# Patient Record
Sex: MALE | URBAN - METROPOLITAN AREA
[De-identification: names, ages, dates, MRNs, and addresses within clinical notes are randomized per-mention and may not be internally consistent; named-entity substitution may affect disease eponyms.]

---

## 2017-04-12 ENCOUNTER — INPATIENT (INPATIENT)
Facility: HOSPITAL | Age: 52
LOS: 2 days | Discharge: HOME | End: 2017-04-15
Attending: HOSPITALIST | Admitting: HOSPITALIST
Payer: COMMERCIAL

## 2017-04-12 VITALS
TEMPERATURE: 98 F | RESPIRATION RATE: 16 BRPM | OXYGEN SATURATION: 100 % | SYSTOLIC BLOOD PRESSURE: 129 MMHG | HEART RATE: 56 BPM | DIASTOLIC BLOOD PRESSURE: 75 MMHG

## 2017-04-12 DIAGNOSIS — L03.90 CELLULITIS, UNSPECIFIED: ICD-10-CM

## 2017-04-12 LAB
ALBUMIN SERPL ELPH-MCNC: 4.3 G/DL — SIGNIFICANT CHANGE UP (ref 3.3–5)
ALP SERPL-CCNC: 59 U/L — SIGNIFICANT CHANGE UP (ref 40–120)
ALT FLD-CCNC: 28 U/L — SIGNIFICANT CHANGE UP (ref 4–41)
AST SERPL-CCNC: 28 U/L — SIGNIFICANT CHANGE UP (ref 4–40)
BASE EXCESS BLDV CALC-SCNC: 2.5 MMOL/L — SIGNIFICANT CHANGE UP
BASOPHILS # BLD AUTO: 0.02 K/UL — SIGNIFICANT CHANGE UP (ref 0–0.2)
BASOPHILS NFR BLD AUTO: 0.3 % — SIGNIFICANT CHANGE UP (ref 0–2)
BILIRUB SERPL-MCNC: 0.4 MG/DL — SIGNIFICANT CHANGE UP (ref 0.2–1.2)
BLOOD GAS VENOUS - CREATININE: 1 MG/DL — SIGNIFICANT CHANGE UP (ref 0.5–1.3)
BUN SERPL-MCNC: 18 MG/DL — SIGNIFICANT CHANGE UP (ref 7–23)
CALCIUM SERPL-MCNC: 9.3 MG/DL — SIGNIFICANT CHANGE UP (ref 8.4–10.5)
CHLORIDE BLDV-SCNC: 104 MMOL/L — SIGNIFICANT CHANGE UP (ref 96–108)
CHLORIDE SERPL-SCNC: 102 MMOL/L — SIGNIFICANT CHANGE UP (ref 98–107)
CO2 SERPL-SCNC: 25 MMOL/L — SIGNIFICANT CHANGE UP (ref 22–31)
CREAT SERPL-MCNC: 1.07 MG/DL — SIGNIFICANT CHANGE UP (ref 0.5–1.3)
EOSINOPHIL # BLD AUTO: 0.1 K/UL — SIGNIFICANT CHANGE UP (ref 0–0.5)
EOSINOPHIL NFR BLD AUTO: 1.3 % — SIGNIFICANT CHANGE UP (ref 0–6)
GAS PNL BLDV: 138 MMOL/L — SIGNIFICANT CHANGE UP (ref 136–146)
GLUCOSE BLDV-MCNC: 108 — HIGH (ref 70–99)
GLUCOSE SERPL-MCNC: 108 MG/DL — HIGH (ref 70–99)
HCO3 BLDV-SCNC: 25 MMOL/L — SIGNIFICANT CHANGE UP (ref 20–27)
HCT VFR BLD CALC: 40.7 % — SIGNIFICANT CHANGE UP (ref 39–50)
HCT VFR BLDV CALC: 44.7 % — SIGNIFICANT CHANGE UP (ref 39–51)
HGB BLD-MCNC: 14.5 G/DL — SIGNIFICANT CHANGE UP (ref 13–17)
HGB BLDV-MCNC: 14.6 G/DL — SIGNIFICANT CHANGE UP (ref 13–17)
IMM GRANULOCYTES NFR BLD AUTO: 0.1 % — SIGNIFICANT CHANGE UP (ref 0–1.5)
LACTATE BLDV-MCNC: 1.2 MMOL/L — SIGNIFICANT CHANGE UP (ref 0.5–2)
LYMPHOCYTES # BLD AUTO: 2.63 K/UL — SIGNIFICANT CHANGE UP (ref 1–3.3)
LYMPHOCYTES # BLD AUTO: 33.2 % — SIGNIFICANT CHANGE UP (ref 13–44)
MCHC RBC-ENTMCNC: 32.7 PG — SIGNIFICANT CHANGE UP (ref 27–34)
MCHC RBC-ENTMCNC: 35.6 % — SIGNIFICANT CHANGE UP (ref 32–36)
MCV RBC AUTO: 91.7 FL — SIGNIFICANT CHANGE UP (ref 80–100)
MONOCYTES # BLD AUTO: 0.68 K/UL — SIGNIFICANT CHANGE UP (ref 0–0.9)
MONOCYTES NFR BLD AUTO: 8.6 % — SIGNIFICANT CHANGE UP (ref 2–14)
NEUTROPHILS # BLD AUTO: 4.49 K/UL — SIGNIFICANT CHANGE UP (ref 1.8–7.4)
NEUTROPHILS NFR BLD AUTO: 56.5 % — SIGNIFICANT CHANGE UP (ref 43–77)
PCO2 BLDV: 48 MMHG — SIGNIFICANT CHANGE UP (ref 41–51)
PH BLDV: 7.37 PH — SIGNIFICANT CHANGE UP (ref 7.32–7.43)
PLATELET # BLD AUTO: 187 K/UL — SIGNIFICANT CHANGE UP (ref 150–400)
PMV BLD: 12.1 FL — SIGNIFICANT CHANGE UP (ref 7–13)
PO2 BLDV: 31 MMHG — LOW (ref 35–40)
POTASSIUM BLDV-SCNC: 3.8 MMOL/L — SIGNIFICANT CHANGE UP (ref 3.4–4.5)
POTASSIUM SERPL-MCNC: 4.1 MMOL/L — SIGNIFICANT CHANGE UP (ref 3.5–5.3)
POTASSIUM SERPL-SCNC: 4.1 MMOL/L — SIGNIFICANT CHANGE UP (ref 3.5–5.3)
PROT SERPL-MCNC: 7.5 G/DL — SIGNIFICANT CHANGE UP (ref 6–8.3)
RBC # BLD: 4.44 M/UL — SIGNIFICANT CHANGE UP (ref 4.2–5.8)
RBC # FLD: 12.4 % — SIGNIFICANT CHANGE UP (ref 10.3–14.5)
SAO2 % BLDV: 52.4 % — LOW (ref 60–85)
SODIUM SERPL-SCNC: 140 MMOL/L — SIGNIFICANT CHANGE UP (ref 135–145)
WBC # BLD: 7.93 K/UL — SIGNIFICANT CHANGE UP (ref 3.8–10.5)
WBC # FLD AUTO: 7.93 K/UL — SIGNIFICANT CHANGE UP (ref 3.8–10.5)

## 2017-04-12 RX ORDER — VANCOMYCIN HCL 1 G
1000 VIAL (EA) INTRAVENOUS ONCE
Qty: 0 | Refills: 0 | Status: DISCONTINUED | OUTPATIENT
Start: 2017-04-12 | End: 2017-04-12

## 2017-04-12 RX ORDER — PIPERACILLIN AND TAZOBACTAM 4; .5 G/20ML; G/20ML
3.38 INJECTION, POWDER, LYOPHILIZED, FOR SOLUTION INTRAVENOUS ONCE
Qty: 0 | Refills: 0 | Status: DISCONTINUED | OUTPATIENT
Start: 2017-04-12 | End: 2017-04-12

## 2017-04-12 RX ADMIN — Medication 100 MILLIGRAM(S): at 21:07

## 2017-04-12 NOTE — ED PROVIDER NOTE - ATTENDING CONTRIBUTION TO CARE
52 yo male no pmh cellulitis failing outpatient, afvss no distress rrr cta abd soft tracking to his distal forarm and proximal from his elbow circumferentially//failing op abx, admit for abx

## 2017-04-12 NOTE — ED ADULT NURSE NOTE - OBJECTIVE STATEMENT
pt. received inn intake 10 B , Pt. A&Ox3 c/o of left arm swelling and redness. Pt. states he had a cut on left arm , and went to the gym where he suspect the arm got infected. Pt. was sent from pcp. Pt. appears comfortable , receiving meds. IV placed on right ac 20 gauge , labs drawn and sent. Will continue to monitor while in the ED.

## 2017-04-12 NOTE — ED ADULT TRIAGE NOTE - CHIEF COMPLAINT QUOTE
Pt was being treated for cellulitis of the left arm on bactrim pt was sent in today for worsening cellulitis of the left arm

## 2017-04-12 NOTE — ED PROVIDER NOTE - OBJECTIVE STATEMENT
50 yo M with no PMH presenting with cellulitis. PT was diagnosed by ephraim with cellulitis and started on bactrim. However, erythema continued to spread and pt was sent to the ER for further evaluation. Denies fevers, chills, cough, rhinorrhea, otorrhea, otalgia, nausea, vomiting, constipation, diarrhea, chest pain, shortness of breath or changes in urinary habits. Symptoms started 5 days ago and pt has been on 2 days of abx.

## 2017-04-12 NOTE — ED PROVIDER NOTE - PHYSICAL EXAMINATION
rash involving the elbow, volar surface of left forearm and wrist - no pain with extension/flexion of elbow - no joint effusion, erythematous consistent with cellulitis

## 2017-04-13 DIAGNOSIS — Z41.8 ENCOUNTER FOR OTHER PROCEDURES FOR PURPOSES OTHER THAN REMEDYING HEALTH STATE: ICD-10-CM

## 2017-04-13 DIAGNOSIS — R00.1 BRADYCARDIA, UNSPECIFIED: ICD-10-CM

## 2017-04-13 DIAGNOSIS — Z78.9 OTHER SPECIFIED HEALTH STATUS: ICD-10-CM

## 2017-04-13 DIAGNOSIS — L03.114 CELLULITIS OF LEFT UPPER LIMB: ICD-10-CM

## 2017-04-13 LAB
APPEARANCE UR: CLEAR — SIGNIFICANT CHANGE UP
BILIRUB UR-MCNC: NEGATIVE — SIGNIFICANT CHANGE UP
BLOOD UR QL VISUAL: NEGATIVE — SIGNIFICANT CHANGE UP
COLOR SPEC: SIGNIFICANT CHANGE UP
GLUCOSE UR-MCNC: NEGATIVE — SIGNIFICANT CHANGE UP
KETONES UR-MCNC: NEGATIVE — SIGNIFICANT CHANGE UP
LEUKOCYTE ESTERASE UR-ACNC: NEGATIVE — SIGNIFICANT CHANGE UP
NITRITE UR-MCNC: NEGATIVE — SIGNIFICANT CHANGE UP
PH UR: 7 — SIGNIFICANT CHANGE UP (ref 4.6–8)
PROT UR-MCNC: NEGATIVE — SIGNIFICANT CHANGE UP
SP GR SPEC: 1.01 — SIGNIFICANT CHANGE UP (ref 1–1.03)
SPECIMEN SOURCE: SIGNIFICANT CHANGE UP
SPECIMEN SOURCE: SIGNIFICANT CHANGE UP
UROBILINOGEN FLD QL: NORMAL E.U. — SIGNIFICANT CHANGE UP (ref 0.1–0.2)

## 2017-04-13 PROCEDURE — 73070 X-RAY EXAM OF ELBOW: CPT | Mod: 26,LT

## 2017-04-13 PROCEDURE — 99223 1ST HOSP IP/OBS HIGH 75: CPT

## 2017-04-13 PROCEDURE — 93971 EXTREMITY STUDY: CPT | Mod: 26

## 2017-04-13 PROCEDURE — 93010 ELECTROCARDIOGRAM REPORT: CPT

## 2017-04-13 RX ADMIN — Medication 100 MILLIGRAM(S): at 21:14

## 2017-04-13 RX ADMIN — Medication 100 MILLIGRAM(S): at 07:16

## 2017-04-13 RX ADMIN — Medication 100 MILLIGRAM(S): at 13:19

## 2017-04-13 NOTE — DISCHARGE NOTE ADULT - PATIENT PORTAL LINK FT
“You can access the [facility] Patient Portal, offered by [facility], by registering with the following website: [email or web address]”

## 2017-04-13 NOTE — DISCHARGE NOTE ADULT - HOSPITAL COURSE
51 -year-old male with no known PMH presenting for worsening LUE cellulitis    Cellulitis of left upper extremity  - IV clindamycin   - Bcx  - wbc not elevated    Bradycardia.    - Sinus  -Pt asx     Need for prophylactic measure  - low risk for DVT, ambulate as tolerated. 51 -year-old male with no known PMH presenting for worsening LUE cellulitis    Cellulitis of left upper extremity:  - Infectious disease cx  - IV clindamycin   - Blood cultures: NTD -----  - WBC not elevated  - LUE Duplex US: negative for DVT ------  - UA: negative  - Left elbow x-ray to r/o bursitis: --------     Bradycardia:    - Sinus  - Pt asymptomatic  - TSH WNL    Need for prophylactic measure  - low risk for DVT, ambulate as tolerated. 51 -year-old male with no known PMH presenting for worsening LUE cellulitis    Cellulitis of left upper extremity:  - Infectious disease cx  - IV clindamycin   - Blood cultures: NTD -----  - WBC not elevated  - LUE Duplex US: negative for DVT  - UA: negative  - Left elbow x-ray to r/o bursitis: Non specific soft tissue swelling over olecranon. Tiny protuberant olecranon enthesophyte    Bradycardia:    - Sinus  - Pt asymptomatic  - TSH WNL    Need for prophylactic measure  - low risk for DVT, ambulate as tolerated. 51 -year-old male with no known past medical history presenting to the ED from [facility] urgent care with worsening cellulitis.  Patient reports a mechanical fall 2 weeks ago when he slipped on the stairs while wearing socks, landing on his left elbow which resulted in a small laceration to the elbow.  Patient denied LOC/head trauma associated with the fall.  Patient reports going to the gym later that week and believes with arm flexion the small laceration on the elbow opened and a few hours later he began to notice erythema and swelling around the elbow.  He went to [facility] at this point and was prescribed Bactrim of which he took a total of 5 pills since Monday.  Yesterday he noted continued extension of the erythema and swelling and therefore returned to the [facility] who gave him the option of changing his antibiotic or going to the ED for IV antibiotics.  Patient denies ever having fevers/chills, numbness/paresthesias in the hand.      In the ED VS:  98  56  129/75  16  100%RA, received Clindamycin 900mg IV x1    51 -year-old male with no known PMH presenting for worsening LUE cellulitis    Cellulitis of left upper extremity:  - Infectious disease cx  - IV clindamycin   - Blood cultures: NTD   - WBC not elevated  - LUE Duplex US: negative for DVT  - UA: negative  - Left elbow x-ray to r/o bursitis: Non specific soft tissue swelling over olecranon. Tiny protuberant olecranon enthesophyte   He was seen by ID and rec to continue with Iv Clindamycin and transition to PO in Clindamycin on 4/15/17 , with Clindamycin  300mg q6 untill 4/21/17 to complete a total of 10 days of antibiotics.  Bradycardia:    - Sinus  - Pt asymptomatic  - TSH WNL    Need for prophylactic measure  - low risk for DVT, ambulate as tolerated. 51 -year-old male with no known past medical history presenting to the ED from [facility] urgent care with worsening cellulitis.  Patient reports a mechanical fall 2 weeks ago when he slipped on the stairs while wearing socks, landing on his left elbow which resulted in a small laceration to the elbow.  Patient denied LOC/head trauma associated with the fall.  Patient reports going to the gym later that week and believes with arm flexion the small laceration on the elbow opened and a few hours later he began to notice erythema and swelling around the elbow.  He went to [facility] at this point and was prescribed Bactrim of which he took a total of 5 pills since Monday. Pt presented to ED d/t worsening of edema  Dx w/Cellulitis of left upper extremity, Infectious disease cx. started on  IV clindamycin. Blood cultures negative. LUE Duplex US: negative for DVT. Left elbow x-ray to r/o bursitis: Non specific soft tissue swelling over olecranon. Tiny protuberant olecranon enthesophyte. Pt w/clinical improvement will change to PO in Clindamycin Clindamycin  300mg q6 untill 4/21/17 to complete a total of 10 days of antibiotics.  - Sinus Bradycardia, pt asymptomatic. TSH WNL  Pt is optimized for discharge

## 2017-04-13 NOTE — DISCHARGE NOTE ADULT - CARE PROVIDERS DIRECT ADDRESSES
,DirectAddress_Unknown,DirectAddress_Unknown,DirectAddress_Unknown ,DirectAddress_Unknown,DirectAddress_Unknown,[email or web address],DirectAddress_Unknown

## 2017-04-13 NOTE — H&P ADULT. - SKIN COMMENTS
erythema receding from lines of demarkation (drawn in ED) extends from wrist to peripheral 1/3 of upper arm

## 2017-04-13 NOTE — H&P ADULT. - MUSCULOSKELETAL COMMENTS
swelling/pitting 1+ edema to arm below elbow, not involving hand/wrist, without pain to palpation or loss of sensation

## 2017-04-13 NOTE — DISCHARGE NOTE ADULT - CARE PROVIDER_API CALL
PCP,   Phone: (   )    -  Fax: (   )    -    [facility] - Medical Offices,   Phone: [phone]  Fax: (   )    - PCP,   Phone: (   )    -  Fax: (   )    -    [facility] - Medical Offices,   Phone: [phone]  Fax: (   )    -    Trish Villalta), Infectious Disease; Internal Medicine  [id number] [address] [place] [id number]  Phone: [phone]  Fax: [phone]

## 2017-04-13 NOTE — H&P ADULT. - PROBLEM SELECTOR PLAN 1
will continue with IV clindamycin for now and monitor clinically for improvement, if continued recession from line of demarkation, can consider changing to oral clinda later in the day  no leukocytosis or signs of systemic infection

## 2017-04-13 NOTE — H&P ADULT. - LYMPHATIC
supraclavicular L/posterior cervical L/anterior cervical L/posterior cervical R/supraclavicular R/anterior cervical R

## 2017-04-13 NOTE — H&P ADULT. - FAMILY HISTORY
Child  Still living? Unknown  Family history of type 1 diabetes mellitus, Age at diagnosis: Age Unknown

## 2017-04-13 NOTE — DISCHARGE NOTE ADULT - PROVIDER TOKENS
FREE:[LAST:[PCP],PHONE:[(   )    -],FAX:[(   )    -]],FREE:[LAST:[[facility] - Medical Offices],PHONE:[phone]],FAX:[(   )    -]] FREE:[LAST:[PCP],PHONE:[(   )    -],FAX:[(   )    -]],FREE:[LAST:[[facility] - Medical Offices],PHONE:[phone]],FAX:[(   )    -]],TOKEN:'[id number]'

## 2017-04-13 NOTE — DISCHARGE NOTE ADULT - NS AS ACTIVITY OBS
Walking-Outdoors allowed/Showering allowed/Bathing allowed Stairs allowed/Bathing allowed/Driving allowed/Walking-Outdoors allowed/Sex allowed/Walking-Indoors allowed/Showering allowed

## 2017-04-13 NOTE — DISCHARGE NOTE ADULT - PLAN OF CARE
Follow up with PCP within 1 week of discharge. Monitor for erythema, warmth, fevers chills. Continue appropriate antibiotic therapy until completed. Asymptomatic in hospital. Follow up with PCP within 1 week of discharge to monitor HR. Return to ED if chest pain or difficulty breathing Follow up with ID within 1 week of discharge. Return to ED if fevers chills, worsening redness or warmth. Asymptomatic in hospital. TSH within normal limits. Follow up with PCP within 1 week of discharge to monitor HR. Follow up with ID within 1 week of discharge?-----. Return to ED if fevers chills, worsening redness or warmth. Symptom control rate control

## 2017-04-13 NOTE — DISCHARGE NOTE ADULT - REASON FOR ADMISSION
Rednesss and Swelling on L upper arm Rednesss and Swelling on L upper arm- L Arm cellulitis and L elbow cellulitis with failure of out patient PO antibiotics. Redness and Swelling on L upper arm- L Arm cellulitis and L elbow cellulitis with failure of out patient PO antibiotics.

## 2017-04-13 NOTE — DISCHARGE NOTE ADULT - NS TRANSFER PATIENT BELONGINGS
Electronic Device (specify)/Jewelry/Money (specify)/laptop, necklace/Clothing/Cell Phone/PDA (specify)

## 2017-04-13 NOTE — H&P ADULT. - HISTORY OF PRESENT ILLNESS
51 -year-old male with no known past medical history presenting to the ED from his primary care doctor's office with worsening cellulitis    In the ED VS:  98  56  129/75  16  100%RA, received Clindamycin 900mg IV x1 51 -year-old male with no known past medical history presenting to the ED from [facility] urgent care with worsening cellulitis.  Patient reports a mechanical fall 2 weeks ago when he slipped on the stairs while wearing socks, landing on his left elbow which resulted in a small laceration to the elbow.  Patient denied LOC/head trauma associated with the fall.  Patient reports going to the gym later that week and believes with arm flexion the small laceration on the elbow opened and a few hours later he began to notice erythema and swelling around the elbow.  He went to [facility] at this point and was prescribed Bactrim of which he took a total of 5 pills since Monday.  Yesterday he noted continued extension of the erythema and swelling and therefore returned to the [facility] who gave him the option of changing his antibiotic or going to the ED for IV antibiotics.  Patient denies ever having fevers/chills, numbness/paresthesias in the hand.      In the ED VS:  98  56  129/75  16  100%RA, received Clindamycin 900mg IV x1

## 2017-04-13 NOTE — DISCHARGE NOTE ADULT - MEDICATION SUMMARY - MEDICATIONS TO TAKE
I will START or STAY ON the medications listed below when I get home from the hospital:    clindamycin 300 mg oral capsule  -- 1 cap(s) by mouth every 6 hours  -- Finish all this medication unless otherwise directed by prescriber.  Medication should be taken with plenty of water.    -- Indication: For Cellulitis    Acidophilus - oral tablet  -- 2 tab(s) by mouth once a day  -- Indication: For Need for prophylactic measure

## 2017-04-13 NOTE — DISCHARGE NOTE ADULT - CARE PLAN
Principal Discharge DX:	Cellulitis of left upper extremity  Goal:	Continue appropriate antibiotic therapy until completed.  Instructions for follow-up, activity and diet:	Follow up with PCP within 1 week of discharge. Monitor for erythema, warmth, fevers chills.  Secondary Diagnosis:	Bradycardia Principal Discharge DX:	Cellulitis of left upper extremity  Instructions for follow-up, activity and diet:	Follow up with ID within 1 week of discharge. Return to ED if fevers chills, worsening redness or warmth.  Secondary Diagnosis:	Bradycardia  Instructions for follow-up, activity and diet:	Asymptomatic in hospital. Follow up with PCP within 1 week of discharge to monitor HR. Return to ED if chest pain or difficulty breathing Principal Discharge DX:	Cellulitis of left upper extremity  Goal:	Symptom control  Instructions for follow-up, activity and diet:	Follow up with ID within 1 week of discharge?-----. Return to ED if fevers chills, worsening redness or warmth.  Secondary Diagnosis:	Bradycardia  Instructions for follow-up, activity and diet:	Asymptomatic in hospital. TSH within normal limits. Follow up with PCP within 1 week of discharge to monitor HR. Principal Discharge DX:	Cellulitis of left upper extremity  Goal:	Symptom control  Instructions for follow-up, activity and diet:	Follow up with ID within 1 week of discharge. Return to ED if fevers chills, worsening redness or warmth.  Secondary Diagnosis:	Bradycardia  Instructions for follow-up, activity and diet:	Asymptomatic in hospital. TSH within normal limits. Follow up with PCP within 1 week of discharge to monitor HR. Principal Discharge DX:	Cellulitis of left upper extremity  Goal:	Symptom control  Instructions for follow-up, activity and diet:	Follow up with ID within 1 week of discharge. Return to ED if fevers chills, worsening redness or warmth.  Secondary Diagnosis:	Bradycardia  Goal:	rate control  Instructions for follow-up, activity and diet:	Asymptomatic in hospital. TSH within normal limits. Follow up with PCP within 1 week of discharge to monitor HR.

## 2017-04-14 LAB
BASOPHILS # BLD AUTO: 0.03 K/UL — SIGNIFICANT CHANGE UP (ref 0–0.2)
BASOPHILS NFR BLD AUTO: 0.5 % — SIGNIFICANT CHANGE UP (ref 0–2)
BUN SERPL-MCNC: 19 MG/DL — SIGNIFICANT CHANGE UP (ref 7–23)
CALCIUM SERPL-MCNC: 9.5 MG/DL — SIGNIFICANT CHANGE UP (ref 8.4–10.5)
CHLORIDE SERPL-SCNC: 106 MMOL/L — SIGNIFICANT CHANGE UP (ref 98–107)
CO2 SERPL-SCNC: 21 MMOL/L — LOW (ref 22–31)
CREAT SERPL-MCNC: 0.96 MG/DL — SIGNIFICANT CHANGE UP (ref 0.5–1.3)
EOSINOPHIL # BLD AUTO: 0.12 K/UL — SIGNIFICANT CHANGE UP (ref 0–0.5)
EOSINOPHIL NFR BLD AUTO: 1.8 % — SIGNIFICANT CHANGE UP (ref 0–6)
GLUCOSE SERPL-MCNC: 106 MG/DL — HIGH (ref 70–99)
HCT VFR BLD CALC: 44.6 % — SIGNIFICANT CHANGE UP (ref 39–50)
HCT VFR BLD CALC: 44.6 % — SIGNIFICANT CHANGE UP (ref 39–50)
HGB BLD-MCNC: 15.5 G/DL — SIGNIFICANT CHANGE UP (ref 13–17)
HGB BLD-MCNC: 15.5 G/DL — SIGNIFICANT CHANGE UP (ref 13–17)
IMM GRANULOCYTES NFR BLD AUTO: 0.2 % — SIGNIFICANT CHANGE UP (ref 0–1.5)
LYMPHOCYTES # BLD AUTO: 1.94 K/UL — SIGNIFICANT CHANGE UP (ref 1–3.3)
LYMPHOCYTES # BLD AUTO: 29.6 % — SIGNIFICANT CHANGE UP (ref 13–44)
MCHC RBC-ENTMCNC: 32 PG — SIGNIFICANT CHANGE UP (ref 27–34)
MCHC RBC-ENTMCNC: 32 PG — SIGNIFICANT CHANGE UP (ref 27–34)
MCHC RBC-ENTMCNC: 34.8 % — SIGNIFICANT CHANGE UP (ref 32–36)
MCHC RBC-ENTMCNC: 34.8 % — SIGNIFICANT CHANGE UP (ref 32–36)
MCV RBC AUTO: 92.1 FL — SIGNIFICANT CHANGE UP (ref 80–100)
MCV RBC AUTO: 92.1 FL — SIGNIFICANT CHANGE UP (ref 80–100)
MONOCYTES # BLD AUTO: 0.78 K/UL — SIGNIFICANT CHANGE UP (ref 0–0.9)
MONOCYTES NFR BLD AUTO: 11.9 % — SIGNIFICANT CHANGE UP (ref 2–14)
NEUTROPHILS # BLD AUTO: 3.68 K/UL — SIGNIFICANT CHANGE UP (ref 1.8–7.4)
NEUTROPHILS NFR BLD AUTO: 56 % — SIGNIFICANT CHANGE UP (ref 43–77)
PLATELET # BLD AUTO: 218 K/UL — SIGNIFICANT CHANGE UP (ref 150–400)
PLATELET # BLD AUTO: 218 K/UL — SIGNIFICANT CHANGE UP (ref 150–400)
PMV BLD: 12.1 FL — SIGNIFICANT CHANGE UP (ref 7–13)
PMV BLD: 12.1 FL — SIGNIFICANT CHANGE UP (ref 7–13)
POTASSIUM SERPL-MCNC: 5 MMOL/L — SIGNIFICANT CHANGE UP (ref 3.5–5.3)
POTASSIUM SERPL-SCNC: 5 MMOL/L — SIGNIFICANT CHANGE UP (ref 3.5–5.3)
RBC # BLD: 4.84 M/UL — SIGNIFICANT CHANGE UP (ref 4.2–5.8)
RBC # BLD: 4.84 M/UL — SIGNIFICANT CHANGE UP (ref 4.2–5.8)
RBC # FLD: 12.5 % — SIGNIFICANT CHANGE UP (ref 10.3–14.5)
RBC # FLD: 12.5 % — SIGNIFICANT CHANGE UP (ref 10.3–14.5)
SODIUM SERPL-SCNC: 142 MMOL/L — SIGNIFICANT CHANGE UP (ref 135–145)
TSH SERPL-MCNC: 0.27 UIU/ML — SIGNIFICANT CHANGE UP (ref 0.27–4.2)
WBC # BLD: 6.56 K/UL — SIGNIFICANT CHANGE UP (ref 3.8–10.5)
WBC # BLD: 6.56 K/UL — SIGNIFICANT CHANGE UP (ref 3.8–10.5)
WBC # FLD AUTO: 6.56 K/UL — SIGNIFICANT CHANGE UP (ref 3.8–10.5)
WBC # FLD AUTO: 6.56 K/UL — SIGNIFICANT CHANGE UP (ref 3.8–10.5)

## 2017-04-14 PROCEDURE — 99222 1ST HOSP IP/OBS MODERATE 55: CPT | Mod: GC

## 2017-04-14 PROCEDURE — 99239 HOSP IP/OBS DSCHRG MGMT >30: CPT

## 2017-04-14 RX ADMIN — Medication 100 MILLIGRAM(S): at 05:27

## 2017-04-14 RX ADMIN — Medication 100 MILLIGRAM(S): at 21:29

## 2017-04-14 RX ADMIN — Medication 100 MILLIGRAM(S): at 14:23

## 2017-04-15 VITALS
TEMPERATURE: 98 F | DIASTOLIC BLOOD PRESSURE: 69 MMHG | SYSTOLIC BLOOD PRESSURE: 119 MMHG | HEART RATE: 51 BPM | OXYGEN SATURATION: 98 % | RESPIRATION RATE: 18 BRPM

## 2017-04-15 LAB
BASOPHILS # BLD AUTO: 0.02 K/UL — SIGNIFICANT CHANGE UP (ref 0–0.2)
BASOPHILS NFR BLD AUTO: 0.3 % — SIGNIFICANT CHANGE UP (ref 0–2)
BUN SERPL-MCNC: 19 MG/DL — SIGNIFICANT CHANGE UP (ref 7–23)
CALCIUM SERPL-MCNC: 9.4 MG/DL — SIGNIFICANT CHANGE UP (ref 8.4–10.5)
CHLORIDE SERPL-SCNC: 102 MMOL/L — SIGNIFICANT CHANGE UP (ref 98–107)
CO2 SERPL-SCNC: 24 MMOL/L — SIGNIFICANT CHANGE UP (ref 22–31)
CREAT SERPL-MCNC: 0.94 MG/DL — SIGNIFICANT CHANGE UP (ref 0.5–1.3)
EOSINOPHIL # BLD AUTO: 0.14 K/UL — SIGNIFICANT CHANGE UP (ref 0–0.5)
EOSINOPHIL NFR BLD AUTO: 2.2 % — SIGNIFICANT CHANGE UP (ref 0–6)
GLUCOSE SERPL-MCNC: 102 MG/DL — HIGH (ref 70–99)
HCT VFR BLD CALC: 44 % — SIGNIFICANT CHANGE UP (ref 39–50)
HGB BLD-MCNC: 15 G/DL — SIGNIFICANT CHANGE UP (ref 13–17)
IMM GRANULOCYTES NFR BLD AUTO: 0.2 % — SIGNIFICANT CHANGE UP (ref 0–1.5)
LYMPHOCYTES # BLD AUTO: 2.82 K/UL — SIGNIFICANT CHANGE UP (ref 1–3.3)
LYMPHOCYTES # BLD AUTO: 43.4 % — SIGNIFICANT CHANGE UP (ref 13–44)
MCHC RBC-ENTMCNC: 31.4 PG — SIGNIFICANT CHANGE UP (ref 27–34)
MCHC RBC-ENTMCNC: 34.1 % — SIGNIFICANT CHANGE UP (ref 32–36)
MCV RBC AUTO: 92.2 FL — SIGNIFICANT CHANGE UP (ref 80–100)
MONOCYTES # BLD AUTO: 0.54 K/UL — SIGNIFICANT CHANGE UP (ref 0–0.9)
MONOCYTES NFR BLD AUTO: 8.3 % — SIGNIFICANT CHANGE UP (ref 2–14)
NEUTROPHILS # BLD AUTO: 2.97 K/UL — SIGNIFICANT CHANGE UP (ref 1.8–7.4)
NEUTROPHILS NFR BLD AUTO: 45.6 % — SIGNIFICANT CHANGE UP (ref 43–77)
PLATELET # BLD AUTO: 235 K/UL — SIGNIFICANT CHANGE UP (ref 150–400)
PMV BLD: 12.3 FL — SIGNIFICANT CHANGE UP (ref 7–13)
POTASSIUM SERPL-MCNC: 4.1 MMOL/L — SIGNIFICANT CHANGE UP (ref 3.5–5.3)
POTASSIUM SERPL-SCNC: 4.1 MMOL/L — SIGNIFICANT CHANGE UP (ref 3.5–5.3)
RBC # BLD: 4.77 M/UL — SIGNIFICANT CHANGE UP (ref 4.2–5.8)
RBC # FLD: 12.3 % — SIGNIFICANT CHANGE UP (ref 10.3–14.5)
SODIUM SERPL-SCNC: 139 MMOL/L — SIGNIFICANT CHANGE UP (ref 135–145)
WBC # BLD: 6.5 K/UL — SIGNIFICANT CHANGE UP (ref 3.8–10.5)
WBC # FLD AUTO: 6.5 K/UL — SIGNIFICANT CHANGE UP (ref 3.8–10.5)

## 2017-04-15 PROCEDURE — 99231 SBSQ HOSP IP/OBS SF/LOW 25: CPT | Mod: GC

## 2017-04-15 RX ORDER — LACTOBACILLUS ACIDOPHILUS 100MM CELL
2 CAPSULE ORAL
Qty: 60 | Refills: 0 | OUTPATIENT
Start: 2017-04-15 | End: 2017-05-15

## 2017-04-15 RX ADMIN — Medication 100 MILLIGRAM(S): at 05:15

## 2017-04-17 LAB
BACTERIA BLD CULT: SIGNIFICANT CHANGE UP
BACTERIA BLD CULT: SIGNIFICANT CHANGE UP

## 2017-07-10 ENCOUNTER — APPOINTMENT (OUTPATIENT)
Dept: HUMAN REPRODUCTION | Facility: CLINIC | Age: 52
End: 2017-07-10

## 2018-01-15 NOTE — PATIENT PROFILE ADULT. - REASON FOR ADMISSION
Assessment  Assessed   1  Arteriosclerosis of coronary artery (414 00) (I25 10)  2  Benign essential hypertension (401 1) (I10)  3  Hyperlipidemia (272 4) (E78 5)  4  Cerebrovascular accident (CVA) due to other mechanism (434 91) (I63 8)    Plan  Arteriosclerosis of coronary artery    · Renew: Nitrostat 0 4 MG Sublingual Tablet Sublingual; TOME 1 TABLET UNDER THE  TONGUE TODOS 5 MINUTES FOR UP TO 3 DOSES REY RAVINERA NECESARIO FOR  CHEST PAIN CALL 911 IF PAIN PERSISTSTAKE 1 TABLE  Rx By: Genet Jose; Dispense: 30 Days ; #:25 TAB; Refill: 0;For: Arteriosclerosis of   coronary artery; ERIKA = N; Verified Transmission to [facility], [place]; Last Updated By: System, [employer]; 2/5/2016 12:00:25 PM  Benign essential hypertension    · Stop: Hydrochlorothiazide 25 MG Oral Tablet  Rx By: Genet Jose; Dispense: 30 Days ; #:30 TAB; Refill: 3;For: Benign essential   hypertension; ERIKA = N; Sent To: [address]  Cerebrovascular accident (CVA) due to other mechanism    · Follow-up visit in 6 months Evaluation and Treatment  Follow-up  Status: Complete   Done: [id number]  Ordered; For: Cerebrovascular accident (CVA) due to other mechanism;  Ordered By:   Genet Jose  Performed:   Due: 61YFW6861; Last Updated By: Michael Larson;   2/5/2016 12:07:42 PM  Type 2 diabetes mellitus with hyperglycemia    · Renew: MetFORMIN HCl - 1000 MG Oral Tablet; TAKE 1 TABLET TWICE DAILY WITH  MEALS  Rx By: Genet Jose; Dispense: 30 Days ; #:60 Tablet; Refill: 0;For: Type 2 diabetes   mellitus with hyperglycemia; ERIKA = N; Record  Type 2 diabetes mellitus with other circulatory complications    · Changed: From  HumaLOG KwikPen 100 UNIT/ML Subcutaneous Solution Pen-injector   To HumaLOG KwikPen 100 UNIT/ML Subcutaneous Solution Pen-injector INJECT  SUBCUTANEOUSLY AS DIRECTED  Rx By: Genet Jose; Dispense: 0 Days ; #:5 X 3 ML Pen (5 Pens);  Refill: 3;For: Type 2   diabetes mellitus with other circulatory complications; ERIKA = N; Record   · Renew: HumaLOG 100 UNIT/ML Subcutaneous Solution; 10 units SQ before breakfast,  lunch and dinner  Rx By: Pili Lozada; Dispense: 30 Days ; #:1 X 10 ML Vial; Refill: 6;For: Type 2 diabetes   mellitus with other circulatory complications; ERIKA = N; Record    Follow-up visit in 1 month Evaluation and Treatment Follow-up Status: Hold For - Scheduling Requested for: 04RCY2351  Ordered; For: Benign Essential Hypertension (401 1); Ordered By: Pili Lozada Performed:  Due: 98GCV2109   Exercise Stress Echocardiogram Test Status: Hold For - Scheduling Requested for: 19IIT0512  Ordered; For: Exertional Angina (413 9); Ordered By: Pili Lozada PerformDay [facility] Due: 90RHD6763  Follow-up visit in 2 weeks Evaluation and Treatment Follow-up Status: Hold For - Scheduling Requested for: 73OIZ8897  Ordered; For: Exertional Angina (413 9); Ordered By: Pili Lozada Performed:  Due: 23SQN3912     Discussion/Summary  Counseling Documentation With Imm: The patient was counseled regarding diagnostic results, instructions for management, risk factor reductions, prognosis, patient and family education, risks and benefits of treatment options, importance of compliance with treatment  total time of encounter was 45 minutes and 35 minutes was spent counseling  Medication SE Review and Pt Understands Tx: Possible side effects of new medications were reviewed with the patient/guardian today  The treatment plan was reviewed with the patient/guardian  The patient/guardian understands and agrees with the treatment plan   Discussion Summary:   Alysa Coffey is asymptomatic from his coronary disease and he denies any heart failure symptoms  His numbness from his recent stroke is improving  He was unable to perform the blood work  We want to recheck his lipid panel  I also started him on metoprolol 25 mg bid and discontinued hydrochlorothiazide   He has tolerated this well and his blood pressure is controlled  I asked him to perform labs  RTC in 6 months  Chief Complaint  Chief Complaint Free Text Note Form: Follow up feeling well  Chief Complaint Chronic Condition [facility]: Patient is here today for follow up of chronic conditions described in HPI  History of Present Illness  Hospital Based Practices Required Assessment:   Pain Assessment   the patient states they do not have pain  (on a scale of 0 to 10, the patient rates the pain at 0 )   Abuse And Domestic Violence Screen    Yes, the patient is safe at home  The patient states no one is hurting them  Depression And Suicide Screen  No, the patient has not had thoughts of hurting themself  No, the patient has not felt depressed in the past 7 days  Prefered Language is  Armenian  Primary Language is  Armenian  HPI: Patient is a 77 yo [place] male with PMH of HTN HLD IDDM BPH and an MI in 2000 and CVA in 2015  Patient had some exertional dyspnea and arm numbness which triggered an exercise stress echo  Stress test were suggestive of possible stress-induced ischemia of the entire inferior wall which had triggered a catheterization on 12/23/2013  LHC had revealed non-intervenable RCA disease and medical tx was recommended  In terms of his cardiac disease he has been asymptomatic and feels well  He is able to tolerate moderate exercise with no associated angina and his exercise tolerance has improved since last visit  He denies any HF symptoms  He has tolerated his meds well  Currently patient denies orthopnea or PND  He denies any chest pain at rest  Denies LE edema  He was unable to perform the blood test we requested however he has started metoprolol and is feeling well  Review of Systems  Cardiology Male ROS:     Cardiac: as noted in HPI, no chest pain, no rhythm problems, no fainting/blackouts, no heart murmur present, no signs of swelling and no palpitations present     Skin: No complaints of nonhealing sores or skin rash    Psychological: No complaints of feeling depressed, anxiety, panic attacks, or difficulty concentrating  General: no changes in weight, no lack of energy/fatigue, no fever and no frequent infections  Respiratory: no shortness of breath, no cough/sputum, no wheezing, no phlegm and no hemoptysis   History of Asthma  HEENT: no throat problems   Gastrointestinal: No complaints of liver problems, nausea, vomiting, heartburn, constipation, bloody stools, diarrhea, problems swallowing, adbominal pain, or rectal bleeding  Hematologic: No complaints of bleeding disorders, anemia, blood clots, or excessive brusing  Neurological: No complaints of numbness, tingling, dizziness, weakness, seizures, headaches, syncope or fainting, AM fatigue, daytime sleepiness, no witnessed apnea episodes  Musculoskeletal: rash on the thigh  ROS Reviewed:   ROS reviewed  Active Problems  Problems   1  Abdominal pain (789 00) (R10 9)  2  Arteriosclerosis of coronary artery (414 00) (I25 10)  3  Asthma (493 90) (J45 909)  4  Benign essential hypertension (401 1) (I10)  5  Cataract (366 9) (H26 9)  6  Cervicalgia (723 1) (M54 2)  7  Diverticulosis (562 10) (K57 90)  8  Dyspepsia (536 8) (K30)  9  Enlarged prostate without lower urinary tract symptoms (luts) (600 00) (N40 0)  10  Exertional angina (413 9) (I20 8)  11  Gastroesophageal reflux disease with esophagitis (530 11) (K21 0)  12  Hyperlipidemia (272 4) (E78 5)  13  Insomnia (780 52) (G47 00)  14  Limb pain (729 5) (M79 609)  15  Lower abdominal pain (789 09) (R10 30)  16  Lower back pain (724 2) (M54 5)  17  Pain in joint of left shoulder (719 41) (M25 512)  18  Thoracic back pain (724 1) (M54 6)  19  Type 2 diabetes mellitus with hyperglycemia (250 00) (E11 65)  20  Type 2 diabetes mellitus with other circulatory complications (804 20) (S72 30)  21  Varicose Veins Of Lower Extremities (454 9)  22   Visit for pre-operative examination (V72 84) (J02 602)    Past Medical History  Problems   1  History of allergic rhinitis (V12 69) (Z87 09)  2  History of pharyngitis (V12 69) (Z87 09)  3  History of Need for influenza vaccination (V04 81) (Z23)  4  History of Need for Tdap vaccination (V06 1) (Z23)  5  History of Tinea corporis (110 5) (B35 4)  Active Problems And Past Medical History Reviewed: The active problems and past medical history were reviewed and updated today  Surgical History  Problems   1  History of Back Surgery  2  History of Cataract Surgery  3  Hernia Repair  4  History of Transurethral Resection Of Prostate (TURP)  Surgical History Reviewed: The surgical history was reviewed and updated today  Family History  Father   1  Family history of Diabetes Mellitus (V18 0)  Sister   2  Family history of kidney disease (V18 69) (Z84 1)  Family History Reviewed: The family history was reviewed and updated today  Social History  Problems    · Denied: History of Alcohol use   · Denied: History of Drug Use   · Former smoker (V15 82) (N04 057)   · Uses Safety Equipment - Seatbelts  Social History Reviewed: The social history was reviewed and updated today  Current Meds  1  Acetaminophen 500 MG Oral Tablet; TAKE 1 TABLET EVERY 4 TO 6 HOURS AS   NEEDED; Therapy: 22Apr2015 to (Davis Balloon)  Requested for: 22Apr2015; Last   Rx:22Apr2015 Ordered  2  AmLODIPine Besylate 10 MG Oral Tablet; TOME 1 TABLETA TODOS LOS D? AS PARA LA   PRESION ALTERIAL  TAKE 1 TABLETDAILY FOR BLOOD PRESSURE; Therapy: 06XKB3708 to (Etha Yaw)  Requested for: 87UOQ2735; Last   Rx:29Jan2016 Ordered  3  Aspirin 81 MG Oral Tablet; TAKE 1 TABLET DAILY; Therapy: 20DYU6794 to (Evaluate:63Juv6208)  Requested for: 26TUS1024; Last   Rx:08Jan2016 Ordered  4  Atorvastatin Calcium 80 MG Oral Tablet; TAKE 1 TABLET DAILY; Therapy: 21VLC9500 to (Evaluate:02Jan2017)  Requested for: 54WEZ8763; Last   Rx:08Jan2016 Ordered  5   BD Insulin Syringe Ultrafine 31G X 5/16" 0 5 ML Miscellaneous; Use daily with insulin; Therapy: 74Phr0526 to (Evaluate:22Gvk8255)  Requested for: 04BHK6927; Last   Rx:04See1535 Ordered  6  BD Pen Needle Airam U/F 32G X 4 MM Miscellaneous; Therapy: 71TYV2316 to Recorded  7  Comfort EZ Pen Needles 31G X 6 MM Miscellaneous; Therapy: 17GWK6416 to Recorded  8  FreeStyle Freedom Lite w/Device Kit; Test blood sugar once daily before a meal;   Therapy: 89Dro5290 to (Evaluate:65Bmv0259)  Requested for: 32AGG2069; Last   Rx:14Xeq0995 Ordered  9  FreeStyle Lancets Miscellaneous; USE AS DIRECTED; Therapy: 11ZJQ9550 to (Evaluate:15Mar2014)  Requested for: 69WOQ9192; Last   Rx:14Jan2014 Ordered  10  FreeStyle Lite Device; Therapy: 83XVW8974 to Recorded  11  FreeStyle Test In Vitro Strip; USE 1 STRIP 3 TIMES DAILY; Therapy: 20DYP0807 to (Evaluate:22Jun2014)  Requested for: 69HFI2021; Last    Rx:24Mar2014 Ordered  12  HumaLOG 100 UNIT/ML Subcutaneous Solution; 10 units SQ before breakfast, lunch    and dinner; Therapy: 15ENF2739 to (Evaluate:69Uhd5880); Last GY:60OGA6444 Ordered  13  HumaLOG KwikPen 100 UNIT/ML Subcutaneous Solution Pen-injector; Therapy: 79EXU9171 to Recorded  14  Hydrochlorothiazide 25 MG Oral Tablet; TOME 1 TABLET POR VIA ORAL    DIARIAMENTETAKE 1 TABLET BY MOUTH ONCE A DAY; Therapy: 93Kkf6504 to (Andrés Walker)  Requested for: 27ERW6207; Last    Rx:29Jan2016 Ordered  15  Ketoconazole 2 % External Cream; APPLY A THIN LAYER TO AFFECTED AREA(S) TWICE    DAILY; Therapy: 67Frr1652 to (Evaluate:63Sjw3850)  Requested for: 02Byv6652; Last    Rx:12Sep2014 Ordered  16  Ketorolac Tromethamine 0 5 % Ophthalmic Solution; Therapy: 01WLB1766 to Recorded  17  Lantus 100 UNIT/ML Subcutaneous Solution; take 34 units at bedtime; Therapy: 56UNU9755 to (Last Rx:08Jan2016) Ordered  18  Lisinopril 20 MG Oral Tablet; TOME 1 TABLET BY MOUTH PATSY BAUTISTA? AS FOR    BLOOD PRESSURE  TAKE 1 TABLET BY MOUTH DAILY FOR BLOOD PRESSURE;     Therapy: 63UVQ8059 to (Danis [place])  Requested for: 48UNM4142; Last    Rx:08Jan2016 Ordered  19  Melatonin 1 MG Oral Tablet; TAKE 1 TABLET Bedtime PRN insomnia; Therapy: 24FRZ2249 to (Evaluate:07Feb2016)  Requested for: 43KFC1021; Last    Rx:08Jan2016 Ordered  20  MetFORMIN HCl - 1000 MG Oral Tablet; TAKE 1 TABLET TWICE DAILY WITH MEALS; Therapy: 46TAL9579 to (Evaluate:07Feb2016); Last WV:87VNM3335 Ordered  21  Metoprolol Tartrate 25 MG Oral Tablet; TAKE 1 TABLET TWICE DAILY; Therapy: 99VMK8254 to (Evaluate:06Jul2016)  Requested for: 33VUZ9736; Last    Rx:08Jan2016 Ordered  22  Naproxen 375 MG Oral Tablet; TAKE 1 TABLET EVERY 12 HOURS AS NEEDED; Therapy: 24FMH6273 to (Evaluate:17Jan2016)  Requested for: 89XOE9128; Last    Rx:19Oct2015 Ordered  23  Nitrostat 0 4 MG Sublingual Tablet Sublingual; PLACE 1 TABLET UNDER THE TONGUE    EVERY 5 MINUTES FOR UP TO 3 DOSES AS NEEDED FOR CHEST PAIN  CALL    911 IF PAIN PERSISTS; Therapy: 29Ebh7559 to (Evaluate:07Feb2016)  Requested for: 57DJJ4350; Last    Rx:08Jan2016 Ordered  24  Omeprazole 20 MG Oral Capsule Delayed Release; TAKE 1 CAPSULE DAILY EVERY    MORNING BEFORE BREAKFAST; Therapy: 99ZEX3524 to (Danis [place])  Requested for: 24FJE3326; Last    Rx:08Jan2016 Ordered  25  Pharmacist Choice Alcohol Pad; Therapy: 18SGX5112 to Recorded  26  Polymyxin B-Trimethoprim 80498-1 1 UNIT/ML-% Ophthalmic Solution; Therapy: 00AGO2070 to Recorded  27  PrednisoLONE Acetate 1 % Ophthalmic Suspension; Therapy: 51NBD9762 to Recorded  28  Proventil  (90 Base) MCG/ACT Inhalation Aerosol Solution; INHALE 1 TO 2    PUFFS EVERY 4 TO 6 HOURS AS NEEDED; Therapy: 05UWH9550 to (Evaluate:13Byn8572); Last Rx:02Jan2015 Ordered  29  Tamsulosin HCl - 0 4 MG Oral Capsule; TOME 1 CAPSULE POR VIA ORAL    DIARIAMENTETAKE 1 CAPSULE BY MOUTH ONCE A DAY; Therapy: 91Jmr9976 to (Evaluate:78Flg9635)  Requested for: 38YXQ0430; Last    Rx:36Sfl0239 Ordered  30   Visine Advanced Relief 0 05-0 1-1-1 % Ophthalmic Solution; use two drops per eye as    needed for irritation and dryness; Therapy: 44YQV2949 to (Last Rx:12Mar2013) Ordered  Medication List Reviewed: The medication list was reviewed and updated today  Allergies  Medication   1  No Known Drug Allergies  Non-Medication   2  Latex    Vitals  Vital Signs [Data Includes: Current Encounter]    Recorded: 22SLD1749 11:22AM   Temperature 98 5 F   Heart Rate 68   Systolic 564   Diastolic 66   Height 5 ft 7 in   Weight 178 lb 9 14 oz   BMI Calculated 27 97   BSA Calculated 1 93     Physical Exam    Constitutional   General appearance: No acute distress, well appearing and well nourished  Eyes   Ophthalmoscopic examination: Normal appearing optic disc and posterior segments  Ears, Nose, Mouth, and Throat - External inspection of ears and nose: Normal without deformities or discharge  Nasal mucosa, septum, and turbinates: Normal, no edema or discharge  Oropharynx: Clear, nares are clear, mucous membranes are moist    Neck   Neck and thyroid: Normal, supple, trachea midline, no thyromegaly  no JVD no carotid bruit  Pulmonary   Respiratory effort: No increased work of breathing or signs of respiratory distress  Cardiovascular   Palpation of heart: Normal PMI, no thrills  Auscultation of heart: Normal rate and rhythm, normal S1 and S2, no murmurs  Carotid pulses: Normal, 2+ bilaterally  Peripheral vascular exam: Normal pulses throughout, no tenderness, erythema or swelling  Pedal pulses: Normal, 2+ bilaterally  Examination of extremities for edema and/or varicosities: Abnormal   varicose veins  No palpable cords  No edema  Chest - Chest: Normal    Abdomen   Abdomen: Non-tender and no distention  Liver and spleen: No hepatomegaly or splenomegaly  Musculoskeletal Gait and station: Normal gait  normal gait  Digits and nails: Normal without clubbing or cyanosis   Inspection/palpation of joints, bones, and muscles: Normal, ROM normal     Skin - Skin and subcutaneous tissue: Abnormal  left hand dorsum well healed scar  Neurologic - Cranial nerves: II - XII intact  Psychiatric - Orientation to person, place, and time: Normal  Mood and affect: Normal       Results/Data  Diagnostic Studies Reviewed Cardio:   Lab Review:  HDL 60 Cr 0 8 Hgb 13   Catheterization: VENTRICLES: -- There were no left ventricular global or regional wall   motion  abnormalities  Global left ventricular function was normal  EF calculated by  contrast ventriculography was 55 %  CORONARY VESSELS: -- The coronary circulation is right dominant  -- Left main: Angiography showed minor luminal irregularities  -- LAD: Angiography showed minor luminal irregularities  -- Distal circumflex: There was a 60 % stenosis just after OM2  -- Proximal RCA: Angiography showed minor luminal irregularities  -- Mid RCA: Angiography showed minor luminal irregularities  -- Distal RCA: Angiography showed minor luminal irregularities  -- Right PDA: The distal vessel was supplied by collaterals from septal  branches of LAD  There was a 95 % stenosis in the middle third of the vessel  segment  In a second lesion, there was a 100 % stenosis in the distal third of  the vessel segment  This lesion is a chronic total occlusion  IMPRESSIONS:   small rPDA too small meaningful PCI  RECOMMENDATIONS  medical therapy    Attending Note  I evaluated and discussed the patient with Dr Dana Arrieta and agree with his assessment and plan  Collaborating Physician Note: Collaborating Physician: I interviewed and examined the patient        Future Appointments    Date/Time Provider Specialty Site   03/08/2016 11:30 AM Specialty Clinic, Endocrinology  [address]   04/05/2016 09:40 AM Specialty Clinic, Urology  [address]   08/05/2016 11:15 AM Devan Koehler MD Cardiology [address] Signatures   Electronically signed by : Sonya Souza MD; Feb 5 2016 11:47AM EST                       (Author)    Electronically signed by : Marcella Ryan MD; Feb 12 2016  9:44AM EST                       (Author)    Electronically signed by : Marcella Ryan MD; Feb 12 2016  9:46AM EST                       (Author) Rednesss and Swelling on L upper arm

## 2018-03-22 ENCOUNTER — APPOINTMENT (OUTPATIENT)
Dept: INTERNAL MEDICINE | Facility: CLINIC | Age: 53
End: 2018-03-22
Payer: COMMERCIAL

## 2018-03-22 VITALS
OXYGEN SATURATION: 98 % | HEART RATE: 51 BPM | DIASTOLIC BLOOD PRESSURE: 70 MMHG | SYSTOLIC BLOOD PRESSURE: 110 MMHG | HEIGHT: 70 IN | TEMPERATURE: 97.7 F | BODY MASS INDEX: 23.91 KG/M2 | WEIGHT: 167 LBS

## 2018-03-22 DIAGNOSIS — Z83.3 FAMILY HISTORY OF DIABETES MELLITUS: ICD-10-CM

## 2018-03-22 DIAGNOSIS — M00.9 PYOGENIC ARTHRITIS, UNSPECIFIED: ICD-10-CM

## 2018-03-22 DIAGNOSIS — M51.26 OTHER INTERVERTEBRAL DISC DISPLACEMENT, LUMBAR REGION: ICD-10-CM

## 2018-03-22 PROCEDURE — 36415 COLL VENOUS BLD VENIPUNCTURE: CPT

## 2018-03-22 PROCEDURE — 99386 PREV VISIT NEW AGE 40-64: CPT | Mod: 25

## 2018-04-09 LAB
ALBUMIN SERPL ELPH-MCNC: 4.4 G/DL
ALP BLD-CCNC: 57 U/L
ALT SERPL-CCNC: 29 U/L
ANION GAP SERPL CALC-SCNC: 13 MMOL/L
APPEARANCE: CLEAR
AST SERPL-CCNC: 31 U/L
BASOPHILS # BLD AUTO: 0.02 K/UL
BASOPHILS NFR BLD AUTO: 0.3 %
BILIRUB SERPL-MCNC: 0.7 MG/DL
BILIRUBIN URINE: NEGATIVE
BLOOD URINE: NEGATIVE
BUN SERPL-MCNC: 19 MG/DL
CALCIUM SERPL-MCNC: 9.1 MG/DL
CHLORIDE SERPL-SCNC: 104 MMOL/L
CHOLEST SERPL-MCNC: 255 MG/DL
CHOLEST/HDLC SERPL: 4.5 RATIO
CO2 SERPL-SCNC: 24 MMOL/L
COLOR: YELLOW
CREAT SERPL-MCNC: 0.87 MG/DL
EOSINOPHIL # BLD AUTO: 0.08 K/UL
EOSINOPHIL NFR BLD AUTO: 1.2 %
GLUCOSE QUALITATIVE U: NEGATIVE MG/DL
GLUCOSE SERPL-MCNC: 88 MG/DL
HBA1C MFR BLD HPLC: 5.3 %
HCT VFR BLD CALC: 43.7 %
HDLC SERPL-MCNC: 57 MG/DL
HGB BLD-MCNC: 14.7 G/DL
IMM GRANULOCYTES NFR BLD AUTO: 0 %
KETONES URINE: NEGATIVE
LDLC SERPL CALC-MCNC: 181 MG/DL
LEUKOCYTE ESTERASE URINE: NEGATIVE
LYMPHOCYTES # BLD AUTO: 2.74 K/UL
LYMPHOCYTES NFR BLD AUTO: 41.1 %
MAN DIFF?: NORMAL
MCHC RBC-ENTMCNC: 31 PG
MCHC RBC-ENTMCNC: 33.6 GM/DL
MCV RBC AUTO: 92.2 FL
MONOCYTES # BLD AUTO: 0.5 K/UL
MONOCYTES NFR BLD AUTO: 7.5 %
NEUTROPHILS # BLD AUTO: 3.33 K/UL
NEUTROPHILS NFR BLD AUTO: 49.9 %
NITRITE URINE: NEGATIVE
PH URINE: 5
PLATELET # BLD AUTO: 210 K/UL
POTASSIUM SERPL-SCNC: 4.5 MMOL/L
PROT SERPL-MCNC: 7.4 G/DL
PROTEIN URINE: NEGATIVE MG/DL
PSA SERPL-MCNC: 1 NG/ML
RBC # BLD: 4.74 M/UL
RBC # FLD: 12.8 %
SODIUM SERPL-SCNC: 141 MMOL/L
SPECIFIC GRAVITY URINE: 1.03
T4 FREE SERPL-MCNC: 1.4 NG/DL
TRIGL SERPL-MCNC: 84 MG/DL
TSH SERPL-ACNC: 0.2 UIU/ML
UROBILINOGEN URINE: NEGATIVE MG/DL
WBC # FLD AUTO: 6.67 K/UL

## 2018-04-26 ENCOUNTER — APPOINTMENT (OUTPATIENT)
Dept: UROLOGY | Facility: CLINIC | Age: 53
End: 2018-04-26
Payer: COMMERCIAL

## 2018-04-26 ENCOUNTER — OUTPATIENT (OUTPATIENT)
Dept: OUTPATIENT SERVICES | Facility: HOSPITAL | Age: 53
LOS: 1 days | End: 2018-04-26
Payer: COMMERCIAL

## 2018-04-26 ENCOUNTER — LABORATORY RESULT (OUTPATIENT)
Age: 53
End: 2018-04-26

## 2018-04-26 DIAGNOSIS — R35.0 FREQUENCY OF MICTURITION: ICD-10-CM

## 2018-04-26 PROCEDURE — 93975 VASCULAR STUDY: CPT | Mod: 26

## 2018-04-26 PROCEDURE — 76870 US EXAM SCROTUM: CPT

## 2018-04-26 PROCEDURE — 99244 OFF/OP CNSLTJ NEW/EST MOD 40: CPT | Mod: 25

## 2018-04-26 PROCEDURE — 76870 US EXAM SCROTUM: CPT | Mod: 26

## 2018-04-26 PROCEDURE — 93975 VASCULAR STUDY: CPT

## 2018-04-29 LAB
ALBUMIN SERPL ELPH-MCNC: 4.4 G/DL
ALP BLD-CCNC: 62 U/L
ALT SERPL-CCNC: 28 U/L
ANION GAP SERPL CALC-SCNC: 13 MMOL/L
APPEARANCE: CLEAR
AST SERPL-CCNC: 24 U/L
BASOPHILS # BLD AUTO: 0.03 K/UL
BASOPHILS NFR BLD AUTO: 0.5 %
BILIRUB SERPL-MCNC: 0.4 MG/DL
BILIRUBIN URINE: NEGATIVE
BLOOD URINE: NEGATIVE
BUN SERPL-MCNC: 19 MG/DL
CALCIUM SERPL-MCNC: 9.6 MG/DL
CHLORIDE SERPL-SCNC: 104 MMOL/L
CHOLEST SERPL-MCNC: 273 MG/DL
CHOLEST/HDLC SERPL: 5.2 RATIO
CO2 SERPL-SCNC: 25 MMOL/L
COLOR: ABNORMAL
CREAT SERPL-MCNC: 0.94 MG/DL
EOSINOPHIL # BLD AUTO: 0.07 K/UL
EOSINOPHIL NFR BLD AUTO: 1.1 %
ESTRADIOL SERPL-MCNC: 5 PG/ML
GLUCOSE QUALITATIVE U: NEGATIVE MG/DL
GLUCOSE SERPL-MCNC: 90 MG/DL
HBA1C MFR BLD HPLC: 5.2 %
HCT VFR BLD CALC: 42 %
HDLC SERPL-MCNC: 53 MG/DL
HGB BLD-MCNC: 15.2 G/DL
IMM GRANULOCYTES NFR BLD AUTO: 0 %
KETONES URINE: NEGATIVE
LDLC SERPL CALC-MCNC: 191 MG/DL
LEUKOCYTE ESTERASE URINE: NEGATIVE
LH SERPL-ACNC: 2.6 IU/L
LYMPHOCYTES # BLD AUTO: 2.73 K/UL
LYMPHOCYTES NFR BLD AUTO: 42.9 %
MAN DIFF?: NORMAL
MCHC RBC-ENTMCNC: 32.6 PG
MCHC RBC-ENTMCNC: 36.2 GM/DL
MCV RBC AUTO: 90.1 FL
MONOCYTES # BLD AUTO: 0.57 K/UL
MONOCYTES NFR BLD AUTO: 9 %
NEUTROPHILS # BLD AUTO: 2.96 K/UL
NEUTROPHILS NFR BLD AUTO: 46.5 %
NITRITE URINE: NEGATIVE
PH URINE: 5
PLATELET # BLD AUTO: 218 K/UL
POTASSIUM SERPL-SCNC: 4.4 MMOL/L
PROLACTIN SERPL-MCNC: 7.6 NG/ML
PROT SERPL-MCNC: 7 G/DL
PROTEIN URINE: NEGATIVE MG/DL
PSA SERPL-MCNC: 1.06 NG/ML
RBC # BLD: 4.66 M/UL
RBC # FLD: 12.8 %
SHBG SERPL-SCNC: 42 NMOL/L
SODIUM SERPL-SCNC: 142 MMOL/L
SPECIFIC GRAVITY URINE: 1.03
TRIGL SERPL-MCNC: 146 MG/DL
TSH SERPL-ACNC: 0.43 UIU/ML
UROBILINOGEN URINE: NEGATIVE MG/DL
WBC # FLD AUTO: 6.36 K/UL

## 2018-04-30 DIAGNOSIS — E34.9 ENDOCRINE DISORDER, UNSPECIFIED: ICD-10-CM

## 2018-05-03 LAB
TESTOST BND SERPL-MCNC: 4.6 PG/ML
TESTOST SERPL-MCNC: 398.1 NG/DL

## 2018-06-14 ENCOUNTER — APPOINTMENT (OUTPATIENT)
Dept: HUMAN REPRODUCTION | Facility: CLINIC | Age: 53
End: 2018-06-14
Payer: COMMERCIAL

## 2018-06-14 PROCEDURE — 89322 SEMEN ANAL STRICT CRITERIA: CPT

## 2018-06-26 ENCOUNTER — APPOINTMENT (OUTPATIENT)
Dept: UROLOGY | Facility: CLINIC | Age: 53
End: 2018-06-26
Payer: COMMERCIAL

## 2018-06-26 DIAGNOSIS — E34.9 ENDOCRINE DISORDER, UNSPECIFIED: ICD-10-CM

## 2018-06-26 PROCEDURE — 99214 OFFICE O/P EST MOD 30 MIN: CPT

## 2019-04-23 ENCOUNTER — APPOINTMENT (OUTPATIENT)
Dept: INTERNAL MEDICINE | Facility: CLINIC | Age: 54
End: 2019-04-23
Payer: COMMERCIAL

## 2019-04-23 ENCOUNTER — NON-APPOINTMENT (OUTPATIENT)
Age: 54
End: 2019-04-23

## 2019-04-23 VITALS
BODY MASS INDEX: 22.21 KG/M2 | HEART RATE: 56 BPM | WEIGHT: 164 LBS | DIASTOLIC BLOOD PRESSURE: 80 MMHG | TEMPERATURE: 97.3 F | OXYGEN SATURATION: 98 % | SYSTOLIC BLOOD PRESSURE: 126 MMHG | HEIGHT: 72 IN

## 2019-04-23 DIAGNOSIS — Z87.898 PERSONAL HISTORY OF OTHER SPECIFIED CONDITIONS: ICD-10-CM

## 2019-04-23 DIAGNOSIS — Z83.438 FAMILY HISTORY OF OTHER DISORDER OF LIPOPROTEIN METABOLISM AND OTHER LIPIDEMIA: ICD-10-CM

## 2019-04-23 PROCEDURE — 93000 ELECTROCARDIOGRAM COMPLETE: CPT

## 2019-04-23 PROCEDURE — 36415 COLL VENOUS BLD VENIPUNCTURE: CPT

## 2019-04-23 PROCEDURE — G0444 DEPRESSION SCREEN ANNUAL: CPT

## 2019-04-23 PROCEDURE — 99396 PREV VISIT EST AGE 40-64: CPT | Mod: 25

## 2019-04-25 PROBLEM — Z83.438 FAMILY HISTORY OF HYPERLIPIDEMIA: Status: ACTIVE | Noted: 2019-04-25

## 2019-04-25 PROBLEM — Z87.898 HISTORY OF MALE INFERTILITY: Status: RESOLVED | Noted: 2018-03-22 | Resolved: 2019-04-25

## 2019-04-25 NOTE — HEALTH RISK ASSESSMENT
[No falls in past year] : Patient reported no falls in the past year [0] : 2) Feeling down, depressed, or hopeless: Not at all (0) [None] : None [Employed] : employed [] :  [Sexually Active] : sexually active [] : No

## 2019-04-25 NOTE — ASSESSMENT
[FreeTextEntry1] : discussed w pt \par \par check routine labs as below \par \par chronic hyperlipidemia as per pt, likely familial as per his hx. cont diet control, exercise. offered statin tx but he declines. also suggested can try red yeast rice which he will consider \par \par advised screening colonoscopy, he will consider making appt soon \par \par RTO yearly for routine exam or earlier prn if any new concerns \par

## 2019-04-25 NOTE — COUNSELING
[Activity counseling provided] : activity [Healthy eating counseling provided] : healthy eating [ - Annual Depression Screening] : Annual Depression Screening

## 2019-04-25 NOTE — PHYSICAL EXAM
[No Acute Distress] : no acute distress [Well Nourished] : well nourished [Well Developed] : well developed [Well-Appearing] : well-appearing [Normal Sclera/Conjunctiva] : normal sclera/conjunctiva [Normal Voice/Communication] : normal voice/communication [PERRL] : pupils equal round and reactive to light [Normal Outer Ear/Nose] : the outer ears and nose were normal in appearance [Normal Oropharynx] : the oropharynx was normal [Normal TMs] : both tympanic membranes were normal [No JVD] : no jugular venous distention [Supple] : supple [No Lymphadenopathy] : no lymphadenopathy [Thyroid Normal, No Nodules] : the thyroid was normal and there were no nodules present [No Respiratory Distress] : no respiratory distress  [Clear to Auscultation] : lungs were clear to auscultation bilaterally [No Accessory Muscle Use] : no accessory muscle use [Normal Rate] : normal rate  [Regular Rhythm] : with a regular rhythm [Normal S1, S2] : normal S1 and S2 [No Murmur] : no murmur heard [No Carotid Bruits] : no carotid bruits [No Abdominal Bruit] : a ~M bruit was not heard ~T in the abdomen [No Varicosities] : no varicosities [Pedal Pulses Present] : the pedal pulses are present [No Edema] : there was no peripheral edema [No Extremity Clubbing/Cyanosis] : no extremity clubbing/cyanosis [Soft] : abdomen soft [Non-distended] : non-distended [No Masses] : no abdominal mass palpated [Non Tender] : non-tender [No HSM] : no HSM [Normal Bowel Sounds] : normal bowel sounds [Normal Sphincter Tone] : normal sphincter tone [No Mass] : no mass [Prostate Enlargement] : the prostate was not enlarged [Prostate Tenderness] : the prostate was not tender [No Prostate Nodules] : no prostate nodules [Normal Anterior Cervical Nodes] : no anterior cervical lymphadenopathy [Normal Posterior Cervical Nodes] : no posterior cervical lymphadenopathy [No Joint Swelling] : no joint swelling [No Spinal Tenderness] : no spinal tenderness [Grossly Normal Strength/Tone] : grossly normal strength/tone [Normal Gait] : normal gait [No Rash] : no rash [Coordination Grossly Intact] : coordination grossly intact [No Focal Deficits] : no focal deficits [Speech Grossly Normal] : speech grossly normal [Normal Affect] : the affect was normal [Normal Mood] : the mood was normal [Normal Insight/Judgement] : insight and judgment were intact

## 2019-04-25 NOTE — HISTORY OF PRESENT ILLNESS
[FreeTextEntry1] : annual physical exam  [de-identified] : 53 y/o man presents for annual exam. he feels well currently , no new concerns. \par \par likely familial hyperlipidemia. he has excellent diet control. he has declined option to start statin therapy.\par exercises regularly without problems \par \par he had evaluation last year for possible infertility, but sperm analysis was adequate. his wife is continuing further evaluations

## 2020-03-19 LAB
ALBUMIN SERPL ELPH-MCNC: 4.6 G/DL
ALP BLD-CCNC: 55 U/L
ALT SERPL-CCNC: 30 U/L
ANION GAP SERPL CALC-SCNC: 12 MMOL/L
APPEARANCE: CLEAR
AST SERPL-CCNC: 27 U/L
BASOPHILS # BLD AUTO: 0.03 K/UL
BASOPHILS NFR BLD AUTO: 0.5 %
BILIRUB SERPL-MCNC: 0.7 MG/DL
BILIRUBIN URINE: NEGATIVE
BLOOD URINE: NEGATIVE
BUN SERPL-MCNC: 17 MG/DL
CALCIUM SERPL-MCNC: 9.4 MG/DL
CHLORIDE SERPL-SCNC: 107 MMOL/L
CHOLEST SERPL-MCNC: 275 MG/DL
CHOLEST/HDLC SERPL: 5.2 RATIO
CO2 SERPL-SCNC: 22 MMOL/L
COLOR: YELLOW
CREAT SERPL-MCNC: 0.83 MG/DL
EOSINOPHIL # BLD AUTO: 0.06 K/UL
EOSINOPHIL NFR BLD AUTO: 0.9 %
ESTIMATED AVERAGE GLUCOSE: 108 MG/DL
GLUCOSE QUALITATIVE U: NEGATIVE
GLUCOSE SERPL-MCNC: 96 MG/DL
HBA1C MFR BLD HPLC: 5.4 %
HCT VFR BLD CALC: 44.6 %
HDLC SERPL-MCNC: 53 MG/DL
HGB BLD-MCNC: 15.5 G/DL
IMM GRANULOCYTES NFR BLD AUTO: 0.2 %
KETONES URINE: NEGATIVE
LDLC SERPL CALC-MCNC: 204 MG/DL
LEUKOCYTE ESTERASE URINE: NEGATIVE
LYMPHOCYTES # BLD AUTO: 2.35 K/UL
LYMPHOCYTES NFR BLD AUTO: 36.3 %
MAN DIFF?: NORMAL
MCHC RBC-ENTMCNC: 31.9 PG
MCHC RBC-ENTMCNC: 34.8 GM/DL
MCV RBC AUTO: 91.8 FL
MONOCYTES # BLD AUTO: 0.51 K/UL
MONOCYTES NFR BLD AUTO: 7.9 %
NEUTROPHILS # BLD AUTO: 3.52 K/UL
NEUTROPHILS NFR BLD AUTO: 54.2 %
NITRITE URINE: NEGATIVE
PH URINE: 6
PLATELET # BLD AUTO: 214 K/UL
POTASSIUM SERPL-SCNC: 4.1 MMOL/L
PROT SERPL-MCNC: 7 G/DL
PROTEIN URINE: NORMAL
PSA SERPL-MCNC: 1.07 NG/ML
RBC # BLD: 4.86 M/UL
RBC # FLD: 12.1 %
SODIUM SERPL-SCNC: 141 MMOL/L
SPECIFIC GRAVITY URINE: 1.03
T4 FREE SERPL-MCNC: 1.3 NG/DL
TRIGL SERPL-MCNC: 90 MG/DL
TSH SERPL-ACNC: 0.25 UIU/ML
UROBILINOGEN URINE: NORMAL
WBC # FLD AUTO: 6.48 K/UL

## 2020-06-01 ENCOUNTER — APPOINTMENT (OUTPATIENT)
Dept: INTERNAL MEDICINE | Facility: CLINIC | Age: 55
End: 2020-06-01

## 2020-06-02 ENCOUNTER — APPOINTMENT (OUTPATIENT)
Dept: OTOLARYNGOLOGY | Facility: CLINIC | Age: 55
End: 2020-06-02
Payer: COMMERCIAL

## 2020-06-02 VITALS
WEIGHT: 170 LBS | BODY MASS INDEX: 24.34 KG/M2 | SYSTOLIC BLOOD PRESSURE: 128 MMHG | TEMPERATURE: 97.6 F | HEART RATE: 47 BPM | HEIGHT: 70 IN | DIASTOLIC BLOOD PRESSURE: 74 MMHG

## 2020-06-02 DIAGNOSIS — H90.3 SENSORINEURAL HEARING LOSS, BILATERAL: ICD-10-CM

## 2020-06-02 DIAGNOSIS — H61.22 IMPACTED CERUMEN, LEFT EAR: ICD-10-CM

## 2020-06-02 PROCEDURE — 92567 TYMPANOMETRY: CPT

## 2020-06-02 PROCEDURE — 92557 COMPREHENSIVE HEARING TEST: CPT

## 2020-06-02 PROCEDURE — 99204 OFFICE O/P NEW MOD 45 MIN: CPT | Mod: 25

## 2020-06-02 NOTE — HISTORY OF PRESENT ILLNESS
[de-identified] : PAtient has had wax issues and tried to clean his ear with debrox and a Qtip and made it worse. HE feels completely clogged on the left side and cannot hear. 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 He is not having any dizziness but has occasional ringing in the left ear. The hearing is muffled in the left ear all the time. He is not having any nasal congestion or runny nose.

## 2020-06-02 NOTE — ASSESSMENT
[FreeTextEntry1] : Patient with a history of noise induced trauma with a sudden sensorineural sudden hearing loss in his left ear while using Q-tips most likely due to cerumen impaction on examination massive cerumen in his left ear which was curetted out audiogram confirmed his baseline hearing loss no further acute interventions indicated I do recommend he follow-up on an annual basis.

## 2020-06-04 ENCOUNTER — APPOINTMENT (OUTPATIENT)
Dept: INTERNAL MEDICINE | Facility: CLINIC | Age: 55
End: 2020-06-04
Payer: COMMERCIAL

## 2020-06-04 ENCOUNTER — NON-APPOINTMENT (OUTPATIENT)
Age: 55
End: 2020-06-04

## 2020-06-04 VITALS
SYSTOLIC BLOOD PRESSURE: 115 MMHG | TEMPERATURE: 98.1 F | HEIGHT: 70 IN | HEART RATE: 5 BPM | OXYGEN SATURATION: 98 % | WEIGHT: 167 LBS | BODY MASS INDEX: 23.91 KG/M2 | DIASTOLIC BLOOD PRESSURE: 75 MMHG

## 2020-06-04 DIAGNOSIS — Z11.59 ENCOUNTER FOR SCREENING FOR OTHER VIRAL DISEASES: ICD-10-CM

## 2020-06-04 PROCEDURE — 36415 COLL VENOUS BLD VENIPUNCTURE: CPT

## 2020-06-04 PROCEDURE — 93000 ELECTROCARDIOGRAM COMPLETE: CPT | Mod: 59

## 2020-06-04 PROCEDURE — 99396 PREV VISIT EST AGE 40-64: CPT | Mod: 25

## 2020-06-04 PROCEDURE — G0444 DEPRESSION SCREEN ANNUAL: CPT

## 2020-06-05 ENCOUNTER — TRANSCRIPTION ENCOUNTER (OUTPATIENT)
Age: 55
End: 2020-06-05

## 2020-07-12 NOTE — HISTORY OF PRESENT ILLNESS
[de-identified] : 56 y/o man presents for annual exam. he feels well currently , no new concerns. exercises without problems. \par \par likely familial hyperlipidemia which has remained consistently elevated for years as per pt. continued excellent diet control, has declined statins. he will be planning to start red yeast rice as option to treat hyperlipidemia.\par \par has not yet had screening colonoscopy but plans for this soon

## 2020-07-12 NOTE — ASSESSMENT
[FreeTextEntry1] : discussed w pt \par \par check routine labs as below \par cont to monitor likely familial hyperlipidemia. he has declined statin therapy \par discussed again in detail re RYR and he plans to try this \par \par again advised re screening colonoscopy, he will plan to make appt this year \par \par declines Tdap vaccine update\par \par cont diet control and regular exercise, healthy lifestyle \par \par RTO yearly for routine exam or earlier prn if any new concerns \par

## 2020-07-12 NOTE — PHYSICAL EXAM
[Well Nourished] : well nourished [No Acute Distress] : no acute distress [Well Developed] : well developed [Normal Voice/Communication] : normal voice/communication [Well-Appearing] : well-appearing [Normal Sclera/Conjunctiva] : normal sclera/conjunctiva [PERRL] : pupils equal round and reactive to light [Normal Outer Ear/Nose] : the outer ears and nose were normal in appearance [Normal Oropharynx] : the oropharynx was normal [Normal TMs] : both tympanic membranes were normal [No JVD] : no jugular venous distention [Supple] : supple [No Lymphadenopathy] : no lymphadenopathy [Thyroid Normal, No Nodules] : the thyroid was normal and there were no nodules present [No Respiratory Distress] : no respiratory distress  [Clear to Auscultation] : lungs were clear to auscultation bilaterally [No Accessory Muscle Use] : no accessory muscle use [Normal Rate] : normal rate  [Regular Rhythm] : with a regular rhythm [No Murmur] : no murmur heard [Normal S1, S2] : normal S1 and S2 [No Carotid Bruits] : no carotid bruits [No Varicosities] : no varicosities [No Abdominal Bruit] : a ~M bruit was not heard ~T in the abdomen [Pedal Pulses Present] : the pedal pulses are present [No Edema] : there was no peripheral edema [No Extremity Clubbing/Cyanosis] : no extremity clubbing/cyanosis [Soft] : abdomen soft [Non Tender] : non-tender [Non-distended] : non-distended [No Masses] : no abdominal mass palpated [Normal Bowel Sounds] : normal bowel sounds [No HSM] : no HSM [Normal Sphincter Tone] : normal sphincter tone [No Mass] : no mass [Prostate Enlargement] : the prostate was not enlarged [No Prostate Nodules] : no prostate nodules [Prostate Tenderness] : the prostate was not tender [Normal Anterior Cervical Nodes] : no anterior cervical lymphadenopathy [Normal Posterior Cervical Nodes] : no posterior cervical lymphadenopathy [No Joint Swelling] : no joint swelling [No Spinal Tenderness] : no spinal tenderness [No Rash] : no rash [Grossly Normal Strength/Tone] : grossly normal strength/tone [Normal Gait] : normal gait [Coordination Grossly Intact] : coordination grossly intact [No Focal Deficits] : no focal deficits [Speech Grossly Normal] : speech grossly normal [Alert and Oriented x3] : oriented to person, place, and time [Normal Affect] : the affect was normal [Normal Mood] : the mood was normal [Normal Insight/Judgement] : insight and judgment were intact

## 2020-07-12 NOTE — HEALTH RISK ASSESSMENT
[No] : In the past 12 months have you used drugs other than those required for medical reasons? No [No falls in past year] : Patient reported no falls in the past year [0] : 2) Feeling down, depressed, or hopeless: Not at all (0) [None] : None [Employed] : employed [] :  [Sexually Active] : sexually active [] : No

## 2020-09-21 LAB
ALBUMIN SERPL ELPH-MCNC: 4.9 G/DL
ALP BLD-CCNC: 60 U/L
ALT SERPL-CCNC: 28 U/L
ANION GAP SERPL CALC-SCNC: 10 MMOL/L
APPEARANCE: CLEAR
AST SERPL-CCNC: 26 U/L
BASOPHILS # BLD AUTO: 0.05 K/UL
BASOPHILS NFR BLD AUTO: 0.8 %
BILIRUB SERPL-MCNC: 0.9 MG/DL
BILIRUBIN URINE: NEGATIVE
BLOOD URINE: NEGATIVE
BUN SERPL-MCNC: 19 MG/DL
CALCIUM SERPL-MCNC: 10 MG/DL
CHLORIDE SERPL-SCNC: 104 MMOL/L
CHOLEST SERPL-MCNC: 279 MG/DL
CHOLEST/HDLC SERPL: 5.3 RATIO
CO2 SERPL-SCNC: 26 MMOL/L
COLOR: YELLOW
CREAT SERPL-MCNC: 0.94 MG/DL
EOSINOPHIL # BLD AUTO: 0.11 K/UL
EOSINOPHIL NFR BLD AUTO: 1.8 %
ESTIMATED AVERAGE GLUCOSE: 100 MG/DL
GLUCOSE QUALITATIVE U: NEGATIVE
GLUCOSE SERPL-MCNC: 94 MG/DL
HBA1C MFR BLD HPLC: 5.1 %
HCT VFR BLD CALC: 45.7 %
HDLC SERPL-MCNC: 52 MG/DL
HGB BLD-MCNC: 15.3 G/DL
IMM GRANULOCYTES NFR BLD AUTO: 0.5 %
KETONES URINE: NEGATIVE
LDLC SERPL CALC-MCNC: 205 MG/DL
LDLC SERPL DIRECT ASSAY-MCNC: 217 MG/DL
LEUKOCYTE ESTERASE URINE: NEGATIVE
LYMPHOCYTES # BLD AUTO: 2.53 K/UL
LYMPHOCYTES NFR BLD AUTO: 40.3 %
MAN DIFF?: NORMAL
MCHC RBC-ENTMCNC: 31.5 PG
MCHC RBC-ENTMCNC: 33.5 GM/DL
MCV RBC AUTO: 94.2 FL
MONOCYTES # BLD AUTO: 0.59 K/UL
MONOCYTES NFR BLD AUTO: 9.4 %
NEUTROPHILS # BLD AUTO: 2.97 K/UL
NEUTROPHILS NFR BLD AUTO: 47.2 %
NITRITE URINE: NEGATIVE
PH URINE: 5.5
PLATELET # BLD AUTO: 227 K/UL
POTASSIUM SERPL-SCNC: 4.5 MMOL/L
PROT SERPL-MCNC: 7.2 G/DL
PROTEIN URINE: NORMAL
PSA SERPL-MCNC: 1.01 NG/ML
RBC # BLD: 4.85 M/UL
RBC # FLD: 12.1 %
SARS-COV-2 IGG SERPL IA-ACNC: 0.1 INDEX
SARS-COV-2 IGG SERPL QL IA: NEGATIVE
SODIUM SERPL-SCNC: 141 MMOL/L
SPECIFIC GRAVITY URINE: 1.03
T4 FREE SERPL-MCNC: 1.3 NG/DL
TRIGL SERPL-MCNC: 109 MG/DL
TSH SERPL-ACNC: 0.22 UIU/ML
UROBILINOGEN URINE: NORMAL
WBC # FLD AUTO: 6.28 K/UL

## 2022-01-27 ENCOUNTER — APPOINTMENT (OUTPATIENT)
Dept: HUMAN REPRODUCTION | Facility: CLINIC | Age: 57
End: 2022-01-27

## 2022-03-13 ENCOUNTER — TRANSCRIPTION ENCOUNTER (OUTPATIENT)
Age: 57
End: 2022-03-13

## 2022-04-11 PROBLEM — Z11.59 SCREENING FOR VIRAL DISEASE: Status: ACTIVE | Noted: 2020-06-04

## 2022-07-09 ENCOUNTER — APPOINTMENT (OUTPATIENT)
Dept: HUMAN REPRODUCTION | Facility: CLINIC | Age: 57
End: 2022-07-09

## 2023-04-20 ENCOUNTER — NON-APPOINTMENT (OUTPATIENT)
Age: 58
End: 2023-04-20

## 2023-04-20 ENCOUNTER — APPOINTMENT (OUTPATIENT)
Dept: INTERNAL MEDICINE | Facility: CLINIC | Age: 58
End: 2023-04-20
Payer: COMMERCIAL

## 2023-04-20 VITALS
HEART RATE: 51 BPM | SYSTOLIC BLOOD PRESSURE: 120 MMHG | DIASTOLIC BLOOD PRESSURE: 80 MMHG | WEIGHT: 163 LBS | OXYGEN SATURATION: 99 % | BODY MASS INDEX: 23.34 KG/M2 | HEIGHT: 70 IN | TEMPERATURE: 97.5 F

## 2023-04-20 DIAGNOSIS — E78.49 OTHER HYPERLIPIDEMIA: ICD-10-CM

## 2023-04-20 DIAGNOSIS — Z12.5 ENCOUNTER FOR SCREENING FOR MALIGNANT NEOPLASM OF PROSTATE: ICD-10-CM

## 2023-04-20 DIAGNOSIS — Z00.00 ENCOUNTER FOR GENERAL ADULT MEDICAL EXAMINATION W/OUT ABNORMAL FINDINGS: ICD-10-CM

## 2023-04-20 PROCEDURE — 93000 ELECTROCARDIOGRAM COMPLETE: CPT

## 2023-04-20 PROCEDURE — 99396 PREV VISIT EST AGE 40-64: CPT | Mod: 25

## 2023-04-20 PROCEDURE — 36415 COLL VENOUS BLD VENIPUNCTURE: CPT

## 2023-04-20 NOTE — PHYSICAL EXAM
[No Acute Distress] : no acute distress [Well Nourished] : well nourished [Well Developed] : well developed [Well-Appearing] : well-appearing [Normal Voice/Communication] : normal voice/communication [Normal Sclera/Conjunctiva] : normal sclera/conjunctiva [PERRL] : pupils equal round and reactive to light [Normal Outer Ear/Nose] : the outer ears and nose were normal in appearance [Normal Oropharynx] : the oropharynx was normal [Normal TMs] : both tympanic membranes were normal [No JVD] : no jugular venous distention [Supple] : supple [No Lymphadenopathy] : no lymphadenopathy [Thyroid Normal, No Nodules] : the thyroid was normal and there were no nodules present [No Respiratory Distress] : no respiratory distress  [Clear to Auscultation] : lungs were clear to auscultation bilaterally [No Accessory Muscle Use] : no accessory muscle use [Normal Rate] : normal rate  [Regular Rhythm] : with a regular rhythm [Normal S1, S2] : normal S1 and S2 [No Murmur] : no murmur heard [No Carotid Bruits] : no carotid bruits [No Varicosities] : no varicosities [No Abdominal Bruit] : a ~M bruit was not heard ~T in the abdomen [Pedal Pulses Present] : the pedal pulses are present [No Edema] : there was no peripheral edema [No Extremity Clubbing/Cyanosis] : no extremity clubbing/cyanosis [Soft] : abdomen soft [Non Tender] : non-tender [Non-distended] : non-distended [No Masses] : no abdominal mass palpated [No HSM] : no HSM [Normal Bowel Sounds] : normal bowel sounds [Normal Sphincter Tone] : normal sphincter tone [No Mass] : no mass [Prostate Enlargement] : the prostate was not enlarged [Prostate Tenderness] : the prostate was not tender [No Prostate Nodules] : no prostate nodules [Normal Supraclavicular Nodes] : no supraclavicular lymphadenopathy [Normal Posterior Cervical Nodes] : no posterior cervical lymphadenopathy [Normal Anterior Cervical Nodes] : no anterior cervical lymphadenopathy [No Spinal Tenderness] : no spinal tenderness [No Joint Swelling] : no joint swelling [Grossly Normal Strength/Tone] : grossly normal strength/tone [No Rash] : no rash [Normal Gait] : normal gait [Coordination Grossly Intact] : coordination grossly intact [No Focal Deficits] : no focal deficits [Speech Grossly Normal] : speech grossly normal [Normal Affect] : the affect was normal [Alert and Oriented x3] : oriented to person, place, and time [Normal Mood] : the mood was normal [Normal Insight/Judgement] : insight and judgment were intact

## 2023-04-20 NOTE — HISTORY OF PRESENT ILLNESS
[de-identified] : 56 y/o man presents for routine physical exam. last visit in office almost 3 years ago. \par he is feeling well, has no concerns. no recent illnesses. exercises without problems. \par \par familial hyperlipidemia managed with diet, consistently elevated for years as per pt. he is considering the option of starting rx. stopped oral supplements \par \par has not yet had first screening colonoscopy but plans to set up this year

## 2023-04-20 NOTE — HEALTH RISK ASSESSMENT
[No] : In the past 12 months have you used drugs other than those required for medical reasons? No [No falls in past year] : Patient reported no falls in the past year [0] : 2) Feeling down, depressed, or hopeless: Not at all (0) [None] : None [Employed] : employed [] :  [Sexually Active] : sexually active

## 2023-04-20 NOTE — ASSESSMENT
[FreeTextEntry1] : discussed w pt \par \par reviewed updated hx\par he is doing well \par \par check routine labs as below \par cont to monitor likely familial hyperlipidemia. discussed in detail re statin tx , risks/benefits. he will seriously consider this and may plan to start rx in next 1-2 years \par \par again advised re screening colonoscopy, he will plan to make appt this year , referred to GI \par \par he had Tdap vaccine and influenza vaccine this year when he had first grandchild \par \par cont diet control and regular exercise, healthy lifestyle \par \par RTO yearly for routine exam or earlier prn if any new concerns \par

## 2023-11-14 LAB
ALBUMIN SERPL ELPH-MCNC: 4.5 G/DL
ALP BLD-CCNC: 61 U/L
ALT SERPL-CCNC: 37 U/L
ANION GAP SERPL CALC-SCNC: 13 MMOL/L
APPEARANCE: CLEAR
AST SERPL-CCNC: 30 U/L
BASOPHILS # BLD AUTO: 0.05 K/UL
BASOPHILS NFR BLD AUTO: 0.7 %
BILIRUB SERPL-MCNC: 0.8 MG/DL
BILIRUBIN URINE: ABNORMAL
BLOOD URINE: NEGATIVE
BUN SERPL-MCNC: 18 MG/DL
CALCIUM SERPL-MCNC: 9.2 MG/DL
CHLORIDE SERPL-SCNC: 103 MMOL/L
CHOLEST SERPL-MCNC: 281 MG/DL
CO2 SERPL-SCNC: 23 MMOL/L
COLOR: NORMAL
CREAT SERPL-MCNC: 0.92 MG/DL
EGFR: 98 ML/MIN/1.73M2
EOSINOPHIL # BLD AUTO: 0.11 K/UL
EOSINOPHIL NFR BLD AUTO: 1.5 %
ESTIMATED AVERAGE GLUCOSE: 105 MG/DL
GLUCOSE QUALITATIVE U: NEGATIVE MG/DL
GLUCOSE SERPL-MCNC: 93 MG/DL
HBA1C MFR BLD HPLC: 5.3 %
HCT VFR BLD CALC: 42.3 %
HDLC SERPL-MCNC: 57 MG/DL
HGB BLD-MCNC: 14.5 G/DL
IMM GRANULOCYTES NFR BLD AUTO: 0.1 %
KETONES URINE: NEGATIVE MG/DL
LDLC SERPL CALC-MCNC: 203 MG/DL
LEUKOCYTE ESTERASE URINE: NEGATIVE
LYMPHOCYTES # BLD AUTO: 2.51 K/UL
LYMPHOCYTES NFR BLD AUTO: 35.1 %
MAN DIFF?: NORMAL
MCHC RBC-ENTMCNC: 31.5 PG
MCHC RBC-ENTMCNC: 34.3 GM/DL
MCV RBC AUTO: 92 FL
MONOCYTES # BLD AUTO: 0.66 K/UL
MONOCYTES NFR BLD AUTO: 9.2 %
NEUTROPHILS # BLD AUTO: 3.82 K/UL
NEUTROPHILS NFR BLD AUTO: 53.4 %
NITRITE URINE: NEGATIVE
NONHDLC SERPL-MCNC: 224 MG/DL
PH URINE: 5
PLATELET # BLD AUTO: 218 K/UL
POTASSIUM SERPL-SCNC: 3.7 MMOL/L
PROT SERPL-MCNC: 6.8 G/DL
PROTEIN URINE: NORMAL MG/DL
PSA SERPL-MCNC: 0.95 NG/ML
RBC # BLD: 4.6 M/UL
RBC # FLD: 12.7 %
SODIUM SERPL-SCNC: 139 MMOL/L
SPECIFIC GRAVITY URINE: 1.03
T4 FREE SERPL-MCNC: 1.3 NG/DL
TRIGL SERPL-MCNC: 104 MG/DL
TSH SERPL-ACNC: 0.34 UIU/ML
UROBILINOGEN URINE: 1 MG/DL
WBC # FLD AUTO: 7.16 K/UL

## 2024-12-18 ENCOUNTER — APPOINTMENT (OUTPATIENT)
Dept: INTERNAL MEDICINE | Facility: CLINIC | Age: 59
End: 2024-12-18

## 2025-07-12 ENCOUNTER — APPOINTMENT (OUTPATIENT)
Dept: INTERNAL MEDICINE | Facility: CLINIC | Age: 60
End: 2025-07-12